# Patient Record
Sex: FEMALE | Race: WHITE | URBAN - METROPOLITAN AREA
[De-identification: names, ages, dates, MRNs, and addresses within clinical notes are randomized per-mention and may not be internally consistent; named-entity substitution may affect disease eponyms.]

---

## 2019-03-21 ENCOUNTER — RECORDS - HEALTHEAST (OUTPATIENT)
Dept: LAB | Facility: CLINIC | Age: 34
End: 2019-03-21

## 2019-03-23 LAB — BACTERIA SPEC CULT: NORMAL

## 2022-11-10 RX ORDER — BUPROPION HYDROCHLORIDE 100 MG/1
100 TABLET, EXTENDED RELEASE ORAL 2 TIMES DAILY
COMMUNITY

## 2022-11-10 RX ORDER — ESCITALOPRAM OXALATE 20 MG/1
25 TABLET ORAL DAILY
COMMUNITY

## 2022-11-10 RX ORDER — FLUTICASONE PROPIONATE 50 MCG
1 SPRAY, SUSPENSION (ML) NASAL DAILY
COMMUNITY

## 2022-11-11 ENCOUNTER — ANESTHESIA EVENT (OUTPATIENT)
Dept: SURGERY | Facility: AMBULATORY SURGERY CENTER | Age: 37
End: 2022-11-11
Payer: COMMERCIAL

## 2022-11-14 ENCOUNTER — HOSPITAL ENCOUNTER (OUTPATIENT)
Facility: AMBULATORY SURGERY CENTER | Age: 37
Discharge: HOME | End: 2022-11-14
Attending: STUDENT IN AN ORGANIZED HEALTH CARE EDUCATION/TRAINING PROGRAM
Payer: COMMERCIAL

## 2022-11-14 ENCOUNTER — ANESTHESIA (OUTPATIENT)
Dept: SURGERY | Facility: AMBULATORY SURGERY CENTER | Age: 37
End: 2022-11-14
Payer: COMMERCIAL

## 2022-11-14 VITALS
BODY MASS INDEX: 21.16 KG/M2 | SYSTOLIC BLOOD PRESSURE: 96 MMHG | DIASTOLIC BLOOD PRESSURE: 61 MMHG | HEIGHT: 62 IN | HEART RATE: 48 BPM | WEIGHT: 115 LBS | RESPIRATION RATE: 16 BRPM | TEMPERATURE: 98 F | OXYGEN SATURATION: 100 %

## 2022-11-14 DIAGNOSIS — N85.6 UTERINE SYNECHIAE: ICD-10-CM

## 2022-11-14 LAB
HCG UR QL: NEGATIVE
INTERNAL QC OK POCT: NORMAL
POCT KIT EXPIRATION DATE: NORMAL
POCT KIT LOT NUMBER: NORMAL

## 2022-11-14 RX ORDER — SODIUM CHLORIDE, SODIUM LACTATE, POTASSIUM CHLORIDE, CALCIUM CHLORIDE 600; 310; 30; 20 MG/100ML; MG/100ML; MG/100ML; MG/100ML
INJECTION, SOLUTION INTRAVENOUS CONTINUOUS
Status: DISCONTINUED | OUTPATIENT
Start: 2022-11-14 | End: 2022-11-15 | Stop reason: HOSPADM

## 2022-11-14 RX ORDER — ACETAMINOPHEN 325 MG/1
975 TABLET ORAL ONCE
Status: DISCONTINUED | OUTPATIENT
Start: 2022-11-14 | End: 2022-11-15 | Stop reason: HOSPADM

## 2022-11-14 RX ORDER — ACETAMINOPHEN 325 MG/1
975 TABLET ORAL ONCE
Status: COMPLETED | OUTPATIENT
Start: 2022-11-14 | End: 2022-11-14

## 2022-11-14 RX ORDER — PROPOFOL 10 MG/ML
INJECTION, EMULSION INTRAVENOUS CONTINUOUS PRN
Status: DISCONTINUED | OUTPATIENT
Start: 2022-11-14 | End: 2022-11-14

## 2022-11-14 RX ORDER — OXYCODONE HYDROCHLORIDE 5 MG/1
5 TABLET ORAL EVERY 4 HOURS PRN
Status: DISCONTINUED | OUTPATIENT
Start: 2022-11-14 | End: 2022-11-15 | Stop reason: HOSPADM

## 2022-11-14 RX ORDER — ACETAMINOPHEN 325 MG/1
975 TABLET ORAL EVERY 6 HOURS PRN
Qty: 50 TABLET | Refills: 0 | Status: SHIPPED | OUTPATIENT
Start: 2022-11-14

## 2022-11-14 RX ORDER — FENTANYL CITRATE 0.05 MG/ML
25 INJECTION, SOLUTION INTRAMUSCULAR; INTRAVENOUS EVERY 5 MIN PRN
Status: DISCONTINUED | OUTPATIENT
Start: 2022-11-14 | End: 2022-11-15 | Stop reason: HOSPADM

## 2022-11-14 RX ORDER — ONDANSETRON 2 MG/ML
4 INJECTION INTRAMUSCULAR; INTRAVENOUS EVERY 30 MIN PRN
Status: DISCONTINUED | OUTPATIENT
Start: 2022-11-14 | End: 2022-11-15 | Stop reason: HOSPADM

## 2022-11-14 RX ORDER — DOXYCYCLINE 100 MG/1
100 CAPSULE ORAL 2 TIMES DAILY
Qty: 14 CAPSULE | Refills: 0 | Status: SHIPPED | OUTPATIENT
Start: 2022-11-14

## 2022-11-14 RX ORDER — FENTANYL CITRATE 50 UG/ML
INJECTION, SOLUTION INTRAMUSCULAR; INTRAVENOUS PRN
Status: DISCONTINUED | OUTPATIENT
Start: 2022-11-14 | End: 2022-11-14

## 2022-11-14 RX ORDER — ESTRADIOL 2 MG/1
2 TABLET ORAL DAILY
Qty: 30 TABLET | Refills: 0 | Status: SHIPPED | OUTPATIENT
Start: 2022-11-14 | End: 2022-12-14

## 2022-11-14 RX ORDER — KETOROLAC TROMETHAMINE 15 MG/ML
INJECTION, SOLUTION INTRAMUSCULAR; INTRAVENOUS PRN
Status: DISCONTINUED | OUTPATIENT
Start: 2022-11-14 | End: 2022-11-14

## 2022-11-14 RX ORDER — PROPOFOL 10 MG/ML
INJECTION, EMULSION INTRAVENOUS PRN
Status: DISCONTINUED | OUTPATIENT
Start: 2022-11-14 | End: 2022-11-14

## 2022-11-14 RX ORDER — MEPERIDINE HYDROCHLORIDE 25 MG/ML
12.5 INJECTION INTRAMUSCULAR; INTRAVENOUS; SUBCUTANEOUS
Status: DISCONTINUED | OUTPATIENT
Start: 2022-11-14 | End: 2022-11-15 | Stop reason: HOSPADM

## 2022-11-14 RX ORDER — IBUPROFEN 800 MG/1
800 TABLET, FILM COATED ORAL ONCE
Status: DISCONTINUED | OUTPATIENT
Start: 2022-11-14 | End: 2022-11-15 | Stop reason: HOSPADM

## 2022-11-14 RX ORDER — LIDOCAINE 40 MG/G
CREAM TOPICAL
Status: DISCONTINUED | OUTPATIENT
Start: 2022-11-14 | End: 2022-11-15 | Stop reason: HOSPADM

## 2022-11-14 RX ORDER — FENTANYL CITRATE 0.05 MG/ML
25 INJECTION, SOLUTION INTRAMUSCULAR; INTRAVENOUS
Status: DISCONTINUED | OUTPATIENT
Start: 2022-11-14 | End: 2022-11-15 | Stop reason: HOSPADM

## 2022-11-14 RX ORDER — IBUPROFEN 800 MG/1
800 TABLET, FILM COATED ORAL EVERY 6 HOURS PRN
Qty: 30 TABLET | Refills: 0 | Status: SHIPPED | OUTPATIENT
Start: 2022-11-14

## 2022-11-14 RX ORDER — ONDANSETRON 2 MG/ML
INJECTION INTRAMUSCULAR; INTRAVENOUS PRN
Status: DISCONTINUED | OUTPATIENT
Start: 2022-11-14 | End: 2022-11-14

## 2022-11-14 RX ORDER — MEDROXYPROGESTERONE ACETATE 10 MG
10 TABLET ORAL DAILY
Qty: 10 TABLET | Refills: 0 | Status: SHIPPED | OUTPATIENT
Start: 2022-11-28

## 2022-11-14 RX ORDER — LIDOCAINE HYDROCHLORIDE 20 MG/ML
INJECTION, SOLUTION INFILTRATION; PERINEURAL PRN
Status: DISCONTINUED | OUTPATIENT
Start: 2022-11-14 | End: 2022-11-14

## 2022-11-14 RX ORDER — ONDANSETRON 4 MG/1
4 TABLET, ORALLY DISINTEGRATING ORAL EVERY 30 MIN PRN
Status: DISCONTINUED | OUTPATIENT
Start: 2022-11-14 | End: 2022-11-15 | Stop reason: HOSPADM

## 2022-11-14 RX ORDER — DEXAMETHASONE SODIUM PHOSPHATE 4 MG/ML
INJECTION, SOLUTION INTRA-ARTICULAR; INTRALESIONAL; INTRAMUSCULAR; INTRAVENOUS; SOFT TISSUE PRN
Status: DISCONTINUED | OUTPATIENT
Start: 2022-11-14 | End: 2022-11-14

## 2022-11-14 RX ORDER — OXYCODONE HYDROCHLORIDE 5 MG/1
5 TABLET ORAL
Status: DISCONTINUED | OUTPATIENT
Start: 2022-11-14 | End: 2022-11-15 | Stop reason: HOSPADM

## 2022-11-14 RX ORDER — HYDROMORPHONE HCL IN WATER/PF 6 MG/30 ML
0.2 PATIENT CONTROLLED ANALGESIA SYRINGE INTRAVENOUS EVERY 5 MIN PRN
Status: DISCONTINUED | OUTPATIENT
Start: 2022-11-14 | End: 2022-11-15 | Stop reason: HOSPADM

## 2022-11-14 RX ADMIN — KETOROLAC TROMETHAMINE 15 MG: 15 INJECTION, SOLUTION INTRAMUSCULAR; INTRAVENOUS at 11:39

## 2022-11-14 RX ADMIN — PROPOFOL 200 MCG/KG/MIN: 10 INJECTION, EMULSION INTRAVENOUS at 11:13

## 2022-11-14 RX ADMIN — DEXAMETHASONE SODIUM PHOSPHATE 4 MG: 4 INJECTION, SOLUTION INTRA-ARTICULAR; INTRALESIONAL; INTRAMUSCULAR; INTRAVENOUS; SOFT TISSUE at 11:19

## 2022-11-14 RX ADMIN — PROPOFOL 30 MG: 10 INJECTION, EMULSION INTRAVENOUS at 11:42

## 2022-11-14 RX ADMIN — PROPOFOL 30 MG: 10 INJECTION, EMULSION INTRAVENOUS at 11:13

## 2022-11-14 RX ADMIN — LIDOCAINE HYDROCHLORIDE 40 MG: 20 INJECTION, SOLUTION INFILTRATION; PERINEURAL at 11:11

## 2022-11-14 RX ADMIN — FENTANYL CITRATE 50 MCG: 50 INJECTION, SOLUTION INTRAMUSCULAR; INTRAVENOUS at 11:11

## 2022-11-14 RX ADMIN — SODIUM CHLORIDE, SODIUM LACTATE, POTASSIUM CHLORIDE, CALCIUM CHLORIDE: 600; 310; 30; 20 INJECTION, SOLUTION INTRAVENOUS at 10:23

## 2022-11-14 RX ADMIN — ACETAMINOPHEN 975 MG: 325 TABLET ORAL at 10:17

## 2022-11-14 RX ADMIN — ONDANSETRON 4 MG: 2 INJECTION INTRAMUSCULAR; INTRAVENOUS at 11:39

## 2022-11-14 ASSESSMENT — ENCOUNTER SYMPTOMS: SEIZURES: 1

## 2022-11-14 NOTE — OP NOTE
Operative Report    Patient: Monse Day    MRN: [id number]    CSN: [id number]    11/14/2022    Procedure date: 11/14/2022    Preoperative Diagnosis:  1. 38yo with intrauterine adhesions     Postoperative Diagnosis: Same    Procedure(s): Procedure(s):  HYSTEROSCOPY, RESECTION OF INTRAUTERINE ADHESIONS, INSERTION OF INTRAUTERINE BALLOON    Attending Surgeon: Leighton Bedoya MD    Assistant(s): Circulator: Dariela Raya RN  Scrub Person: MARCIANO RIVERO    Anesthesia: MAC    EBL: 10mL    Fluids: see anesthesia record     UOP: N/A    Fluid deficit: minimal     Description of findings:  EUA revealed normal size anteverted uterus, no adnexal masses.  Hysteroscopy demonstrated uterine synechiae at the fundus and in the left cavity. Unable to see left uterine ostia. Right ostia normal appearing.     Specimens submitted:  ID Type Source Tests Collected by Time Destination   1 :  [facility] SURGICAL PATHOLOGY EXAM Leighton Bedoya MD 11/14/2022 11:30 AM        Disposition: Stable to PACU     Complications: none    Description of Operation:    The patient was explained the procedure. All risks, benefits, alternatives were discussed. The consent form was signed with a witness present.    The patient was taken to the OR where SCDs were placed and turned on. General anesthesia was induced without difficulty. The patient was then placed in dorsal lithotomy position using Juan Diego stirrups. She was examined for the above noted findings. Then, the patient was prepped and draped in usual sterile fashion.    The bladder was drained with a straight catheter. A bivalve speculum was used to identify the cervix. Singled toothed tenaculum was placed on the anterior lip of the cervix. The cervix was dilated to accomodate an operative hysteroscope sequentially using Loreta dilators. An operative hysteroscope was carefully introduced through the cervix to the uterus under direct visualization. Sterile saline was used a medium  for visualization of the uterine cavity. The left ostia was obscured by synechia which were resected under ultrasound guidance. After the cavity appeared clear he hysteroscopy was removed. Under ultrasound guidance a 8 [place] pediatric keith was placed into the uterine cavity and the balloon was filled with 3cc NS. Next the tenaculum was removed from the cervix and the puncture sites were made hemostatic with silver nitrite. All instruments were then removed from the vagina.    Ins and outs were noted. All needle, instrument, and lap sponge count correct x 2. The procedure was overall without complication. The patient was repositioned to dorsal supine position, extubated without event and taken to the recovery room in stable condition.    Leighton Bedoya MD

## 2022-11-14 NOTE — ANESTHESIA CARE TRANSFER NOTE
Patient: Monse Day    Procedure: Procedure(s):  HYSTEROSCOPY, RESECTION OF INTRAUTERINE ADHESIONS, INSERTION OF INTRAUTERINE BALLOON       Diagnosis: Uterine synechiae [N85.6]  Diagnosis Additional Information: No value filed.    Anesthesia Type:   MAC     Note:    Oropharynx: oropharynx clear of all foreign objects and spontaneously breathing  Level of Consciousness: drowsy  Oxygen Supplementation: room air  Level of Supplemental Oxygen (L/min / FiO2): 0  Independent Airway: airway patency satisfactory and stable  Dentition: dentition unchanged  Vital Signs Stable: post-procedure vital signs reviewed and stable  Report to RN Given: handoff report given  Patient transferred to: Phase II    Handoff Report: Identifed the Patient, Identified the Reponsible Provider, Reviewed the pertinent medical history, Discussed the surgical course, Reviewed Intra-OP anesthesia mangement and issues during anesthesia, Set expectations for post-procedure period and Allowed opportunity for questions and acknowledgement of understanding      Vitals:  Vitals Value Taken Time   BP 85/50 11/14/22 1155   Temp 98  F (36.7  C) 11/14/22 1155   Pulse 57    Resp 16 11/14/22 1155   SpO2 98 % 11/14/22 1155       Electronically Signed By: JEFFERY RAMIREZ CRNA  November 14, 2022  11:56 AM

## 2022-11-14 NOTE — ANESTHESIA PREPROCEDURE EVALUATION
Anesthesia Pre-Procedure Evaluation    Patient: Monse Day   MRN: [id number] : 1985        Procedure : Procedure(s):  HYSTEROSCOPY, RESECTION OF INTRAUTERINE ADHESIONS, INSERTION OF INTRAUTERINE BALLOON          Past Medical History:   Diagnosis Date     Pneumonia      Renal disease      Seizures (H)     withdrawals      History reviewed. No pertinent surgical history.   No Known Allergies   Social History     Tobacco Use     Smoking status: Former     Types: Cigarettes     Smokeless tobacco: Never   Substance Use Topics     Alcohol use: Not Currently     Comment: Past      Wt Readings from Last 1 Encounters:   11 49 kg (108 lb)        Anesthesia Evaluation   Pt has had prior anesthetic.     No history of anesthetic complications       ROS/MED HX  ENT/Pulmonary:  - neg pulmonary ROS     Neurologic:  - neg neurologic ROS   (+) seizures (from EtOH withdrawl, w/u included negative EEG),     Cardiovascular:  - neg cardiovascular ROS     METS/Exercise Tolerance:     Hematologic:  - neg hematologic  ROS     Musculoskeletal:  - neg musculoskeletal ROS     GI/Hepatic:  - neg GI/hepatic ROS     Renal/Genitourinary:  - neg Renal ROS     Endo:  - neg endo ROS     Psychiatric/Substance Use:  - neg psychiatric ROS   (+) alcohol abuse (in remission)     Infectious Disease:  - neg infectious disease ROS     Malignancy:  - neg malignancy ROS     Other:  - neg other ROS          Physical Exam    Airway  airway exam normal      Mallampati: II   TM distance: > 3 FB   Neck ROM: full   Mouth opening: > 3 cm    Respiratory Devices and Support         Dental  no notable dental history         Cardiovascular   cardiovascular exam normal          Pulmonary   pulmonary exam normal                OUTSIDE LABS:  CBC:   Lab Results   Component Value Date    WBC 7.4 2011    WBC 7.3 2011    HGB 14.5 2011    HGB 12.1 2011    HCT 43.4 2011    HCT 36.8 2011     2011     2011      BMP:   Lab Results   Component Value Date     03/04/2011     02/27/2011    POTASSIUM 3.9 03/04/2011    POTASSIUM 3.5 02/27/2011    CHLORIDE 102 03/04/2011    CHLORIDE 103 02/27/2011    CO2 26 03/04/2011    CO2 26 02/27/2011    BUN 14 03/04/2011    BUN 9 02/27/2011    CR 0.77 03/04/2011    CR 0.82 02/27/2011    GLC 87 03/04/2011    GLC 86 02/27/2011     COAGS: No results found for: PTT, INR, FIBR  POC:   Lab Results   Component Value Date    HCG Negative 11/14/2022    HCGS Negative 03/04/2011     HEPATIC:   Lab Results   Component Value Date    ALBUMIN 4.1 03/04/2011    PROTTOTAL 6.7 (L) 03/04/2011    ALT 28 03/04/2011    AST 29 03/04/2011    ALKPHOS 47 03/04/2011    BILITOTAL 0.2 03/04/2011     OTHER:   Lab Results   Component Value Date    ELIJAH 9.2 03/04/2011    MAG 2.1 03/04/2011       Anesthesia Plan    ASA Status:  2   NPO Status:  NPO Appropriate    Anesthesia Type: MAC.     - Reason for MAC: immobility needed, straight local not clinically adequate   Induction: Propofol.   Maintenance: TIVA.        Consents    Anesthesia Plan(s) and associated risks, benefits, and realistic alternatives discussed. Questions answered and patient/representative(s) expressed understanding.    - Discussed:     - Discussed with:  Patient         Postoperative Care    Pain management: Multi-modal analgesia.   PONV prophylaxis: Ondansetron (or other 5HT-3), Dexamethasone or Solumedrol     Comments:    Other Comments: Toradol if OK with surgeon    Reviewed anesthetic options and risks. Patient agrees to proceed.     Recent Results (from the past 120 hour(s))  -HCG qualitative urine POCT:   Collection Time: 11/14/22 10:09 AM       Result                                            Value                         Ref Range                       HCG Qual Urine                                    Negative                      Negative                        Internal QC Check POCT                            Valid                          Valid                           POCT Kit Lot Number                               [id number]                                                        POCT Kit Expiration Date                          4/30/2024                                                          Wes Dunaway MD

## 2022-11-14 NOTE — DISCHARGE INSTRUCTIONS
If you have any questions or concerns regarding your procedure, please contact Dr. Bedoya, her office number is [phone].    You received 975 mg of acetaminophen (Tylenol) at 10:17 AM. Please do not take an additional dose of Tylenol until after 4:17 PM.    Do not exceed 4,000 mg of acetaminophen in a 24 hour period, keep in mind that acetaminophen can also be found in many over-the-counter cold medications as well as narcotics that may be given for pain.    Intrauterine balloon will remain in place for 7 days. If it expels, call the office to let us know.   Start taking estradiol for 30 days. For the last 10 days you will also start taking Provera.     You received a dose of IV Toradol at 11:39 AM. Please do not take any additional NSAID products (Aleve/Advil/Motrin/Naproxen/Celebrex) until after 5:39 PM.

## 2022-11-14 NOTE — PROGRESS NOTES
I, the writer, have viewed a picture on the patient's phone showing a Negative COVID-19 result.    Viet Vila RN on 11/14/2022 at 10:02 AM

## 2022-11-14 NOTE — PROGRESS NOTES
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     Past Medical History:   Diagnosis Date     Pneumonia      Renal disease      Seizures (H)     withdrawals     History reviewed. No pertinent surgical history.      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   Reviewed intraop plan with the patient. Discussed surgical risks including but not limited to pain, infection, bleeding, damage to surrounding structures including bladder/bowel/nerves; informed consent obtained. No significant changes to med/surg history. COVID19 neg, Bhcg neg. Proceed to OR.     Leighton Bedoya MD  11/14/2022   11:04 AM  "

## 2022-11-14 NOTE — ANESTHESIA POSTPROCEDURE EVALUATION
Patient: Monse Day    Procedure: Procedure(s):  HYSTEROSCOPY, RESECTION OF INTRAUTERINE ADHESIONS, INSERTION OF INTRAUTERINE BALLOON       Anesthesia Type:  MAC    Note:  Disposition: Outpatient   Postop Pain Control: Uneventful            Sign Out: Well controlled pain   PONV: No   Neuro/Psych: Uneventful            Sign Out: Acceptable/Baseline neuro status   Airway/Respiratory: Uneventful            Sign Out: Acceptable/Baseline resp. status   CV/Hemodynamics: Uneventful            Sign Out: Acceptable CV status; No obvious hypovolemia; No obvious fluid overload   Other NRE: NONE   DID A NON-ROUTINE EVENT OCCUR? No           Last vitals:  Vitals Value Taken Time   /61 11/14/22 1215   Temp 98  F (36.7  C) 11/14/22 1155   Pulse 55 11/14/22 1221   Resp 16 11/14/22 1155   SpO2 100 % 11/14/22 1221   Vitals shown include unvalidated device data.    Electronically Signed By: Wes Dunaway MD  November 14, 2022  12:24 PM

## 2023-12-24 ENCOUNTER — HEALTH MAINTENANCE LETTER (OUTPATIENT)
Age: 38
End: 2023-12-24

## 2025-01-18 ENCOUNTER — HEALTH MAINTENANCE LETTER (OUTPATIENT)
Age: 40
End: 2025-01-18